# Patient Record
Sex: FEMALE | Race: OTHER | HISPANIC OR LATINO | Employment: OTHER | URBAN - METROPOLITAN AREA
[De-identification: names, ages, dates, MRNs, and addresses within clinical notes are randomized per-mention and may not be internally consistent; named-entity substitution may affect disease eponyms.]

---

## 2019-01-20 ENCOUNTER — HOSPITAL ENCOUNTER (EMERGENCY)
Facility: HOSPITAL | Age: 84
Discharge: HOME | End: 2019-01-20
Attending: EMERGENCY MEDICINE
Payer: MEDICAID

## 2019-01-20 ENCOUNTER — HOSPITAL ENCOUNTER (EMERGENCY)
Dept: RADIOLOGY | Facility: HOSPITAL | Age: 84
Discharge: HOME | End: 2019-01-20
Payer: MEDICAID

## 2019-01-20 ENCOUNTER — APPOINTMENT (EMERGENCY)
Dept: NON INVASIVE DIAGNOSTICS | Facility: CLINIC | Age: 84
End: 2019-01-20
Payer: MEDICAID

## 2019-01-20 ENCOUNTER — APPOINTMENT (EMERGENCY)
Dept: RADIOLOGY | Facility: HOSPITAL | Age: 84
End: 2019-01-20
Payer: MEDICAID

## 2019-01-20 VITALS
WEIGHT: 150 LBS | HEART RATE: 62 BPM | HEIGHT: 60 IN | TEMPERATURE: 97.8 F | RESPIRATION RATE: 18 BRPM | OXYGEN SATURATION: 96 % | BODY MASS INDEX: 29.45 KG/M2 | SYSTOLIC BLOOD PRESSURE: 164 MMHG | DIASTOLIC BLOOD PRESSURE: 67 MMHG

## 2019-01-20 DIAGNOSIS — S82.253A: Primary | ICD-10-CM

## 2019-01-20 DIAGNOSIS — S82.453A: ICD-10-CM

## 2019-01-20 LAB
ANION GAP SERPL CALCULATED.3IONS-SCNC: 6 MMOL/L (ref 4–13)
BASOPHILS # BLD AUTO: 0.03 THOUSANDS/ΜL (ref 0–0.1)
BASOPHILS NFR BLD AUTO: 1 % (ref 0–1)
BUN SERPL-MCNC: 32 MG/DL (ref 5–25)
CALCIUM SERPL-MCNC: 9.2 MG/DL (ref 8.3–10.1)
CHLORIDE SERPL-SCNC: 103 MMOL/L (ref 100–108)
CO2 SERPL-SCNC: 26 MMOL/L (ref 21–32)
CREAT SERPL-MCNC: 1.2 MG/DL (ref 0.6–1.3)
EOSINOPHIL # BLD AUTO: 0.22 THOUSAND/ΜL (ref 0–0.61)
EOSINOPHIL NFR BLD AUTO: 4 % (ref 0–6)
ERYTHROCYTE [DISTWIDTH] IN BLOOD BY AUTOMATED COUNT: 13.6 % (ref 11.6–15.1)
GFR SERPL CREATININE-BSD FRML MDRD: 41 ML/MIN/1.73SQ M
GLUCOSE SERPL-MCNC: 126 MG/DL (ref 65–140)
HCT VFR BLD AUTO: 34.4 % (ref 34.8–46.1)
HGB BLD-MCNC: 10.8 G/DL (ref 11.5–15.4)
IMM GRANULOCYTES # BLD AUTO: 0.02 THOUSAND/UL (ref 0–0.2)
IMM GRANULOCYTES NFR BLD AUTO: 0 % (ref 0–2)
LYMPHOCYTES # BLD AUTO: 1.79 THOUSANDS/ΜL (ref 0.6–4.47)
LYMPHOCYTES NFR BLD AUTO: 31 % (ref 14–44)
MCH RBC QN AUTO: 30.9 PG (ref 26.8–34.3)
MCHC RBC AUTO-ENTMCNC: 31.4 G/DL (ref 31.4–37.4)
MCV RBC AUTO: 99 FL (ref 82–98)
MONOCYTES # BLD AUTO: 0.4 THOUSAND/ΜL (ref 0.17–1.22)
MONOCYTES NFR BLD AUTO: 7 % (ref 4–12)
NEUTROPHILS # BLD AUTO: 3.27 THOUSANDS/ΜL (ref 1.85–7.62)
NEUTS SEG NFR BLD AUTO: 57 % (ref 43–75)
NRBC BLD AUTO-RTO: 0 /100 WBCS
PLATELET # BLD AUTO: 239 THOUSANDS/UL (ref 149–390)
PMV BLD AUTO: 10 FL (ref 8.9–12.7)
POTASSIUM SERPL-SCNC: 3.4 MMOL/L (ref 3.5–5.3)
RBC # BLD AUTO: 3.49 MILLION/UL (ref 3.81–5.12)
SODIUM SERPL-SCNC: 135 MMOL/L (ref 136–145)
WBC # BLD AUTO: 5.73 THOUSAND/UL (ref 4.31–10.16)

## 2019-01-20 PROCEDURE — 99284 EMERGENCY DEPT VISIT MOD MDM: CPT

## 2019-01-20 PROCEDURE — 93971 EXTREMITY STUDY: CPT | Performed by: SURGERY

## 2019-01-20 PROCEDURE — 73610 X-RAY EXAM OF ANKLE: CPT

## 2019-01-20 PROCEDURE — 80048 BASIC METABOLIC PNL TOTAL CA: CPT | Performed by: EMERGENCY MEDICINE

## 2019-01-20 PROCEDURE — 93971 EXTREMITY STUDY: CPT

## 2019-01-20 PROCEDURE — 96374 THER/PROPH/DIAG INJ IV PUSH: CPT

## 2019-01-20 PROCEDURE — 85025 COMPLETE CBC W/AUTO DIFF WBC: CPT | Performed by: EMERGENCY MEDICINE

## 2019-01-20 PROCEDURE — 73590 X-RAY EXAM OF LOWER LEG: CPT

## 2019-01-20 PROCEDURE — 96376 TX/PRO/DX INJ SAME DRUG ADON: CPT

## 2019-01-20 PROCEDURE — 36415 COLL VENOUS BLD VENIPUNCTURE: CPT | Performed by: EMERGENCY MEDICINE

## 2019-01-20 RX ORDER — MORPHINE SULFATE 4 MG/ML
4 INJECTION, SOLUTION INTRAMUSCULAR; INTRAVENOUS ONCE
Status: COMPLETED | OUTPATIENT
Start: 2019-01-20 | End: 2019-01-20

## 2019-01-20 RX ORDER — POTASSIUM CHLORIDE 20 MEQ/1
20 TABLET, EXTENDED RELEASE ORAL ONCE
Status: COMPLETED | OUTPATIENT
Start: 2019-01-20 | End: 2019-01-20

## 2019-01-20 RX ORDER — KETOROLAC TROMETHAMINE 10 MG/1
10 TABLET, FILM COATED ORAL EVERY 6 HOURS PRN
COMMUNITY
End: 2019-02-08

## 2019-01-20 RX ORDER — IBUPROFEN 400 MG/1
400 TABLET ORAL EVERY 6 HOURS PRN
Qty: 30 TABLET | Refills: 0 | Status: SHIPPED | OUTPATIENT
Start: 2019-01-20

## 2019-01-20 RX ORDER — OXYCODONE HYDROCHLORIDE AND ACETAMINOPHEN 5; 325 MG/1; MG/1
1 TABLET ORAL EVERY 4 HOURS PRN
Qty: 14 TABLET | Refills: 0 | Status: SHIPPED | OUTPATIENT
Start: 2019-01-20 | End: 2019-01-30

## 2019-01-20 RX ORDER — VALSARTAN AND HYDROCHLOROTHIAZIDE 160; 25 MG/1; MG/1
1 TABLET ORAL 2 TIMES DAILY
COMMUNITY
End: 2019-03-06

## 2019-01-20 RX ADMIN — MORPHINE SULFATE 2 MG: 4 INJECTION INTRAVENOUS at 13:58

## 2019-01-20 RX ADMIN — POTASSIUM CHLORIDE 20 MEQ: 1500 TABLET, EXTENDED RELEASE ORAL at 12:54

## 2019-01-20 RX ADMIN — MORPHINE SULFATE 4 MG: 4 INJECTION INTRAVENOUS at 12:30

## 2019-01-20 NOTE — DISCHARGE INSTRUCTIONS
If any worsening pain, decreased sensation in the lower leg, fevers come back to the emergency department you have to call the number to make an appointment  Fractura de pierna   CUIDADO AMBULATORIO:   [place] fractura de pierna  es saturnino quebradura en cualquiera de los 3 huesos largos de la pierna  El fémur es el hueso más alyce y va de mayes cadera a la rodilla  Elnor Mends y tibia son los 2 huesos en la parte inferior de mayes pierna que Chari Francheska de mayes rodilla al tobillo  Los signos y síntomas más comunes incluyen los siguientes:   · Dolor que empeora cuando usted se para sobre la pierna lesionada o la mueve    · Dificultad para  la pierna    · Posición o forma anormal de la pierna    · Inflamación o moretones    · Debilidad o pérdida de sensación en la pierna  Busque atención médica de inmediato si:   · Mayes pierna se siente cálida, sensible y adolorida  Se podría adina inflamado y gerber  · El dolor que siente en mayes pierna lesionada empeora aún después de descansar y lory medicamentos  · El yeso se moja o se daña  · El pie o los dedos del pie están entumecidos  · La piel en los dedos de la pierna lesionada se enrojece, está fría, o [place]  Comuníquese con mayes médico o mayes especialista en huesos si:   · Usted tiene fiebre  · El yeso o la abrazadera están muy apretados  · Hay manchas nuevas de arnoldo o un mal olor que sale de por debajo del yeso  · Usted tiene dificultad para  mayes pierna o la dificultad que ya tenía empeora  · Usted tiene preguntas o inquietudes acerca de mayes condición o cuidado  El [address] tratamiento va a depender del tipo de fractura que usted tenga y mayes gravedad  Es posible que usted necesite alguno de los siguientes:  · Miranda Knuckles, un yeso o saturnino férula  se podrían colocar en mayes pierna para limitar mayes movimiento y sostener los huesos fracturados en mayes lugar  Estos aparatos podrían ayudar a disminuir el dolor y prevenir más daño a los [place]      · Un medicamento con receta para el dolor  podrían ser [place]  Pregunte cómo lory estos medicamentos de saturnino forma [place]  · AINEs (Analgésicos antiinflamatorios no esteroides) francis el ibuprofeno, ayudan a disminuir la inflamación, el dolor y la [place]  Los AINEs pueden causar sangrado estomacal o problemas renales en ciertas personas  Si usted angélica un medicamento anticoagulante, siempre pregúntele a mayes médico si los ORALIA son seguros para usted  Siempre carla la etiqueta de vipul medicamento y [place] instrucciones  · Acetaminofeno:  iwona el dolor y baja la fiebre  Está disponible sin receta médica  Pregunte la cantidad y la frecuencia con que debe tomarlos  [address]  Carla las etiquetas de todos los demás medicamentos que esté usando para saber si también contienen acetaminofén, o pregunte a mayes médico o farmacéutico  El acetaminofén puede causar daño en el hígado cuando no se angélica de forma correcta  No use más de 4 gramos (4000 miligramos) en total de acetaminofeno en un día  · La fijación externa  es cirugía que se realiza para colocarle tornillos en los huesos fracturados a través de la piel  Los tornillos se fijarán a un dispositivo fuera de mayes pierna  La fijación externa mantiene los [place], de modo que se puedan sanar  Usted aún podría necesitar cirugía abierta en mayes pierna para reparar áreas lesionadas después de que se haya removido la fijación externa  · La reducción abierta y fijación interna  podría realizarse para regresar los huesos a mayes posición correcta  [place] podría incluir el uso de cables, clavos, placas o tornillos especiales  Estos instrumentos sirven para [employer] partes del hueso alineadas de modo que la pierna sane adecuadamente  · La tracción  puede ser necesaria si mayes hueso se quebró [employer]  La tracción [employer] para colocarlos nuevamente en mayes lugar  Es posible que se coloque un clavo en mayes hueso o yeso y se amarre al dispositivo de tracción   Se cuelgan pesas del dispositivo de tracción para ayudar a poner los [place] posición correcta  Cuidado del yeso o la férula:   · Fíjese todos los días si la piel alrededor del yeso o la férula está enrojecida o [place]  · No use objetos afilados o punzantes para rascarse la piel debajo del yeso o la férula  · No se quite la férula a menos que mayes médico lo autorice  Bañarse con un yeso o saturnino férula:  No permita que el yeso o férula se moje  Antes de bañarse, cubra el yeso o la férula con saturnino bolsa plástica  Pegue la bolsa a mayes piel encima de la férula o yeso para sellar el agua  Mantenga la pierna fuera del agua en harvinder de fugas en la bolsa  Pregunte cuándo puede lory un baño o [place]  Cuidados personales:   · Descanse  la pierna francis se le indique y evite actividades que causen dolor en las piernas  · Aplique hielo  en la pierna de 15 a 20 minutos cada hora o francis se le indique  Use un paquete de hielo o ponga hielo molido dentro de [employer]  Cúbrala con saturnino toalla  El hielo ayuda a evitar daño al tejido y a disminuir la inflamación y el dolor  · Eleve  mayes pierna por encima del nivel de mayes corazón con la mayor frecuencia posible  [place] va a disminuir inflamación y el dolor  Coloque mayes pierna sobre almohadas o cobijas para mantenerla elevada cómodamente  · Use muletas o un andador  según las indicaciones  Las [employer] ayudarán a caminar y quitar un poco de peso de la pierna lesionada hasta que sane  · Fisioterapia  podría recomendarse  Un fisioterapeuta le puede enseñar ejercicios para ayudarle a mejorar el movimiento y la fuerza, y para disminuir el dolor  Acuda a rimma consultas de control con mayes médico o especialista en huesos según le indicaron:  Es posible que deba regresar para que le quiten la férula o el yeso  Es posible que necesite saturnino radiografía de la pierna para comprobar qué tan melvina el hueso booth sanado   Anote rimma preguntas para que se acuerde de hacerlas nai rimma visitas  © 2017 [address] Information is for End User's use only and may not be sold, redistributed or otherwise used for commercial purposes  All illustrations and images included in CareNotes® are the copyrighted property of A D A M , Inc  or Jey Lomeli  Esta información es sólo para uso en educación  [place] intención no es darle un consejo médico sobre enfermedades o tratamientos  Colsulte con anderson Marck Patch farmacéutico antes de seguir cualquier régimen médico para saber si es seguro y efectivo para usted

## 2019-01-20 NOTE — ED PROVIDER NOTES
History  Chief Complaint   Patient presents with    Leg Pain     per son at bedside, pt broke her RLL in December, "splinted", states she flew here yesterday from [place] & now has leg pain  HPI    This is an 80 year female that presents today with right lower extremity pain  Patient lives in [place] and about a month ago end of December she had a fall which resulted and a lower extremity fracture  Patient was found to have a proximal and distal fibular fracture along with a mid tibial fracture  The son states he took the patient to the ER and they put a posterior splint on  He states it was an open fracture and they prescribed antibiotics and discharged  They have been taking care the patient at home  Patient flew from [place] to this states yesterday and she has extreme pain in her leg  The leg is more swollen than normal   She denies any numbness  Pain localized distal to the knee  The open laceration has healed  They have not seen any doctor regarding the fracture  80 year female that presents today with lower extremity pain  Patient's son brought x-rays which I saw  Patient does have swelling of her leg she will need a duplex study  His compartments are soft  Slight warmth to the leg  Will get basic lab work  Patient does have good DP pulses  Patient has tenderness diffusely but also the calf  Prior to Admission Medications   Prescriptions Last Dose Informant Patient Reported? Taking?   ketorolac (TORADOL) 10 mg tablet   Yes Yes   Sig: Take 10 mg by mouth every 6 (six) hours as needed for moderate pain   valsartan-hydrochlorothiazide (DIOVAN-HCT) 160-25 MG per tablet   Yes Yes   Sig: Take 1 tablet by mouth 2 (two) times a day        Facility-Administered Medications: None       Past Medical History:   Diagnosis Date    Fracture     right lower leg    Hypertension        History reviewed  No pertinent surgical history  History reviewed  No pertinent family history    I have reviewed and agree with the history as documented  Social History   Substance Use Topics    Smoking status: Never Smoker    Smokeless tobacco: Never Used    Alcohol use No        Review of Systems   Constitutional: Negative  Negative for diaphoresis and fever  HENT: Negative  Respiratory: Negative  Negative for cough, shortness of breath and wheezing  Cardiovascular: Negative  Negative for chest pain, palpitations and leg swelling  Gastrointestinal: Negative for abdominal distention, abdominal pain, nausea and vomiting  Genitourinary: Negative  Musculoskeletal:        Right lower extremity pain   Skin: Negative  Neurological: Negative  Psychiatric/Behavioral: Negative  All other systems reviewed and are negative  Physical Exam  Physical Exam   Constitutional: She is oriented to person, place, and time  She appears well-developed and well-nourished  No distress  HENT:   Head: Normocephalic and atraumatic  Mouth/Throat: Oropharynx is clear and moist    Eyes: Pupils are equal, round, and reactive to light  EOM are normal    Neck: Normal range of motion  Neck supple  Cardiovascular: Normal rate, regular rhythm and normal heart sounds  No murmur heard  Pulmonary/Chest: Effort normal and breath sounds normal  No respiratory distress  She has no wheezes  Abdominal: Soft  Bowel sounds are normal  She exhibits no distension  There is no tenderness  There is no rebound and no guarding  Musculoskeletal: Normal range of motion  She exhibits edema and tenderness  Right lower extremity slightly warm to the shin  2+ DP pulses  Soft compartments  Tenderness diffusely but most pronounced and the calf area  Patient has some swelling localized to the ankle  Pain with movement of her ankle  Neurological: She is alert and oriented to person, place, and time  Skin: Skin is warm  She is not diaphoretic  Psychiatric: She has a normal mood and affect  Vitals reviewed        Vital Signs  ED Triage Vitals [01/20/19 1159]   Temperature Pulse Respirations Blood Pressure SpO2   97 8 °F (36 6 °C) 64 18 (!) 205/84 99 %      Temp Source Heart Rate Source Patient Position - Orthostatic VS BP Location FiO2 (%)   Tympanic Monitor Sitting Right arm --      Pain Score       3           Vitals:    01/20/19 1415 01/20/19 1430 01/20/19 1445 01/20/19 1515   BP: 155/65  151/65 164/67   Pulse: 60 60 64 62   Patient Position - Orthostatic VS:           Visual Acuity      ED Medications  Medications   morphine (PF) 4 mg/mL injection 4 mg (4 mg Intravenous Given 1/20/19 1230)   potassium chloride (K-DUR,KLOR-CON) CR tablet 20 mEq (20 mEq Oral Given 1/20/19 1254)   morphine (PF) 4 mg/mL injection 4 mg (2 mg Intravenous Given 1/20/19 1358)       Diagnostic Studies  Results Reviewed     Procedure Component Value Units Date/Time    Basic metabolic panel [[id number]]  (Abnormal) Collected:  01/20/19 1225    Lab Status:  Final result Specimen:  Blood from Arm, Left Updated:  01/20/19 1243     Sodium 135 (L) mmol/L      Potassium 3 4 (L) mmol/L      Chloride 103 mmol/L      CO2 26 mmol/L      ANION GAP 6 mmol/L      BUN 32 (H) mg/dL      Creatinine 1 20 mg/dL      Glucose 126 mg/dL      Calcium 9 2 mg/dL      eGFR 41 ml/min/1 73sq m     Narrative:         National Kidney Disease Education Program recommendations are as follows:  GFR calculation is accurate only with a steady state creatinine  Chronic Kidney disease less than 60 ml/min/1 73 sq  meters  Kidney failure less than 15 ml/min/1 73 sq  meters      CBC and differential [[id number]]  (Abnormal) Collected:  01/20/19 1225    Lab Status:  Final result Specimen:  Blood from Arm, Left Updated:  01/20/19 1233     WBC 5 73 Thousand/uL      RBC 3 49 (L) Million/uL      Hemoglobin 10 8 (L) g/dL      Hematocrit 34 4 (L) %      MCV 99 (H) fL      MCH 30 9 pg      MCHC 31 4 g/dL      RDW 13 6 %      MPV 10 0 fL      Platelets 083 Thousands/uL      nRBC 0 /100 WBCs Neutrophils Relative 57 %      Immat GRANS % 0 %      Lymphocytes Relative 31 %      Monocytes Relative 7 %      Eosinophils Relative 4 %      Basophils Relative 1 %      Neutrophils Absolute 3 27 Thousands/µL      Immature Grans Absolute 0 02 Thousand/uL      Lymphocytes Absolute 1 79 Thousands/µL      Monocytes Absolute 0 40 Thousand/µL      Eosinophils Absolute 0 22 Thousand/µL      Basophils Absolute 0 03 Thousands/µL                  VAS lower limb venous duplex study, unilateral/limited   Final Result by Cassie Claudio MD (01/20 1677)      XR ankle 3+ views RIGHT   Final Result by Alexus Hobson MD (01/20 4009)      Comminuted fracture of the mid to distal tibial shaft  Comminuted fracture of the distal fibular shaft  Clinical service was aware of the findings at the time of dictation  Workstation performed: [id number]         XR tibia fibula 2 views RIGHT   Final Result by Alexus Hobson MD (01/20 4794)      Comminuted fracture of the mid to distal tibial shaft as noted above  Comminuted fractures of the proximal and distal fibular shaft  Clinical service was aware of the findings at the time of dictation           Workstation performed: [id number]                    Procedures  Static Splint Application  Date/Time: 1/20/2019 2:46 PM  Performed by: Mahendra Keene by: Dallin Salguero     Patient location:  Bedside  Procedure performed by emergency physician: Yes    Other Assisting Provider: Yes (comment)    Consent:     Consent obtained:  Verbal    Consent given by:  Patient    Risks discussed:  Numbness, discoloration, pain and swelling    Alternatives discussed:  No treatment  Universal protocol:     Patient identity confirmed:  Verbally with patient  Indication:     Indications: fracture    Pre-procedure details:     Sensation:  Normal  Procedure details:     Laterality:  Right    Location:  Leg    Leg:  R lower leg    Splint type:  Long leg    Supplies:  Ortho-Glass  Post-procedure details:     Pain:  Unchanged    Sensation:  Normal    Neurovascular Exam: skin pink, capillary refill <2 sec, normal pulses and skin intact, warm, and dry      Patient tolerance of procedure: Tolerated well, no immediate complications           Phone Contacts  ED Phone Contact    ED Course  ED Course as of Jan 22 0036   [place] Jan 20, 2019   1403 Spoke with Dr Deb Lundy from Orthopedics who stated this will eventually require surgery but should be done by the [facility] physicians  He does not believe abelardo would be adequate place  Will transfer to [facility] trauma  Patient needs admission for pain control and difficulty with ambulation  Pending Vascular duplex study    1406 Negative duplex study      1418 I spoke with Dr Eloisa Wallace from Orthopedics at [facility] who stated that patient does not need to be admitted to the hospital this can all be done outpatient  Patient is to be given a wheelchair and sent home on pain medication  He states there is no indication for admission at all with this patient  I discussed with the surgeon both at [facility] and at [facility] regarding pain management this is been a 1 month issue she has been having she does not know how to follow up but they still believe that she should be given the number to follow up with Orthopedics  I have attempted multiple times to get this patient admitted I spoke with or trauma they stated this is a an isolated Ortho injury and should only go to Ortho since Ortho was not accepting this patient I will discharge this patient and discussed with family regarding the plan    [id number] Patient splinted without any issues  I discussed with family regarding outpatient followup who are hesitant  Discussed best to call the office this week to followup and can get outpatient workup  Wheelchair arranged  MDM  Number of Diagnoses or Management Options  Diagnosis management comments:  Will do repeat x-rays to assess the progress  CritCare Time    Disposition  Final diagnoses:   Comminuted fracture of shaft of tibia   Comminuted fracture of shaft of fibula     Time reflects when diagnosis was documented in both MDM as applicable and the Disposition within this note     Time User Action Codes Description Comment    1/20/2019  2:49 PM Joanie Plasencia Sathyaanjelica Darrylnitin U  8  [[id number]] Comminuted fracture of shaft of tibia     1/20/2019  2:49 PM [address] Comminuted fracture of shaft of fibula     1/20/2019  2:49 PM Josse Moss Comminuted fracture of shaft of fibula     1/20/2019  2:49 PM George Soria Remove [[id number]] Comminuted fracture of shaft of tibia     1/20/2019  2:49 PM George Soria Remove [[id number]] Comminuted fracture of shaft of fibula     1/20/2019  2:49 PM George Soria Add [[id number]] Comminuted fracture of shaft of tibia     1/20/2019  2:49 PM George Soria Add [[id number]] Comminuted fracture of shaft of fibula       ED Disposition     ED Disposition Condition Comment    Discharge  Arnav Cruise discharge to home/self care      Condition at discharge: Good        Follow-up Information     Follow up With Specialties Details Why Contact Info    Alecia Perera MD Orthopedic Surgery Schedule an appointment as soon as possible for a visit Please call this number to make an appointment [address]  [phone]            Discharge Medication List as of 1/20/2019  2:51 PM      START taking these medications    Details   ibuprofen (MOTRIN) 400 mg tablet Take 1 tablet (400 mg total) by mouth every 6 (six) hours as needed for mild pain, Starting Sun 1/20/2019, Print      oxyCODONE-acetaminophen (PERCOCET) 5-325 mg per tablet Take 1 tablet by mouth every 4 (four) hours as needed for moderate pain for up to 10 days Max Daily Amount: 6 tablets, Starting Sun 1/20/2019, Until Wed 1/30/2019, Print         CONTINUE these medications which have NOT CHANGED    Details   ketorolac (TORADOL) 10 mg tablet Take 10 mg by mouth every 6 (six) hours as needed for moderate pain, Historical Med      valsartan-hydrochlorothiazide (DIOVAN-HCT) 160-25 MG per tablet Take 1 tablet by mouth 2 (two) times a day  , Historical Med           No discharge procedures on file      ED Provider  Electronically Signed by           Princess Lorne MD  01/22/19 9657

## 2019-01-21 ENCOUNTER — TELEPHONE (OUTPATIENT)
Dept: OBGYN CLINIC | Facility: CLINIC | Age: 84
End: 2019-01-21

## 2019-01-21 NOTE — TELEPHONE ENCOUNTER
Dr Faith Lora' son Darreld Can called because she has a comminuted fx of shaft of tibia  She went to [facility] ED and they told her to f/u with Dr Michelle Blood however she has [address]   Best contact # [phone] Thank you

## 2019-01-22 NOTE — TELEPHONE ENCOUNTER
Dr Darci Sheth reviewed imaging and said she will need to go to a trauma center in [place], if her insurance will not allow her to go to [place]

## 2019-01-23 ENCOUNTER — TELEPHONE (OUTPATIENT)
Dept: OBGYN CLINIC | Facility: CLINIC | Age: 84
End: 2019-01-23

## 2019-01-23 NOTE — TELEPHONE ENCOUNTER
I spoke with Juhi Mckinney and informed him that the patient needs to call her insurance and find an Orthopedic Trauma physician as per Dr Sabi Vasquez  I had Florceita explain this to Juhi Mckinney in [address] [facility] also  Patient stated he understood and would call the insurance    Todays appointment has been cancelled

## 2019-02-08 ENCOUNTER — HOSPITAL ENCOUNTER (EMERGENCY)
Facility: HOSPITAL | Age: 84
Discharge: HOME | End: 2019-02-08
Attending: EMERGENCY MEDICINE
Payer: COMMERCIAL

## 2019-02-08 VITALS
HEART RATE: 64 BPM | RESPIRATION RATE: 18 BRPM | TEMPERATURE: 98.2 F | OXYGEN SATURATION: 98 % | DIASTOLIC BLOOD PRESSURE: 77 MMHG | SYSTOLIC BLOOD PRESSURE: 182 MMHG

## 2019-02-08 DIAGNOSIS — S82.91XA CLOSED FRACTURE OF RIGHT LOWER EXTREMITY, INITIAL ENCOUNTER: Primary | ICD-10-CM

## 2019-02-08 PROCEDURE — 99283 EMERGENCY DEPT VISIT LOW MDM: CPT

## 2019-02-08 RX ORDER — OXYCODONE HYDROCHLORIDE AND ACETAMINOPHEN 5; 325 MG/1; MG/1
1 TABLET ORAL EVERY 4 HOURS PRN
COMMUNITY
End: 2019-03-06

## 2019-02-08 RX ORDER — OXYCODONE HYDROCHLORIDE AND ACETAMINOPHEN 5; 325 MG/1; MG/1
1 TABLET ORAL EVERY 6 HOURS PRN
Qty: 15 TABLET | Refills: 0 | Status: SHIPPED | OUTPATIENT
Start: 2019-02-08 | End: 2019-03-06

## 2019-02-08 RX ORDER — CHLORHEXIDINE GLUCONATE 0.12 MG/ML
15 RINSE ORAL 2 TIMES DAILY
Qty: 120 ML | Refills: 0 | Status: SHIPPED | OUTPATIENT
Start: 2019-02-08

## 2019-02-09 NOTE — ED PROVIDER NOTES
History  Chief Complaint   Patient presents with    Leg Injury     Patient seen in ED 1/20 with comminuted fx right tibia, has splint in place  Son has been having issues with insurance and arranging follow up with ortho so patient not seen by ortho  Patient has been taking splint off every night, she tells son it is too hot  Patient presents for evaluation of right leg fracture  Patient had a fall in [place] over 1 month ago  She needs surgery but is having trouble finding a doctor  [facility] orthopedics referred her to [facility] because of the extent of the fracture, however, she has [place] Medicaid and cant be seen in [place]  She was told to call her insurance company to find a doctor  The son has been unable to locate one  Patient is also taking the splint off at night because it is too hot  Also complains of dental swelling and that her dentures dont fit  History provided by:  Patient   used: No        Prior to Admission Medications   Prescriptions Last Dose Informant Patient Reported? Taking?   ibuprofen (MOTRIN) 400 mg tablet Past Week at Unknown time  No Yes   Sig: Take 1 tablet (400 mg total) by mouth every 6 (six) hours as needed for mild pain   oxyCODONE-acetaminophen (PERCOCET) 5-325 mg per tablet 2/7/2019 at Unknown time Family Member Yes Yes   Sig: Take 1 tablet by mouth every 4 (four) hours as needed for moderate pain   valsartan-hydrochlorothiazide (DIOVAN-HCT) 160-25 MG per tablet 2/8/2019 at Unknown time  Yes Yes   Sig: Take 1 tablet by mouth 2 (two) times a day        Facility-Administered Medications: None       Past Medical History:   Diagnosis Date    Fracture     right lower leg    Hypertension        History reviewed  No pertinent surgical history  History reviewed  No pertinent family history  I have reviewed and agree with the history as documented      Social History   Substance Use Topics    Smoking status: Never Smoker    Smokeless tobacco: Never Used   [facility] Alcohol use No        Review of Systems   All other systems reviewed and are negative  Physical Exam  Physical Exam   Constitutional: No distress  HENT:   Mouth/Throat: Oropharynx is clear and moist        Cardiovascular: Normal rate, regular rhythm and intact distal pulses  Pulmonary/Chest: Effort normal and breath sounds normal  No respiratory distress  Musculoskeletal:        Legs:  Neurological: She is alert  Skin: Capillary refill takes less than 2 seconds  She is not diaphoretic  Nursing note and vitals reviewed        Vital Signs  ED Triage Vitals [02/08/19 2120]   Temperature Pulse Respirations Blood Pressure SpO2   98 2 °F (36 8 °C) 64 18 (!) 182/77 98 %      Temp Source Heart Rate Source Patient Position - Orthostatic VS BP Location FiO2 (%)   Tympanic Monitor Sitting Left arm --      Pain Score       3           Vitals:    02/08/19 2120 02/08/19 2130   BP: (!) 182/77 (!) 182/77   Pulse: 64    Patient Position - Orthostatic VS: Sitting        Visual Acuity      ED Medications  Medications - No data to display    Diagnostic Studies  Results Reviewed     None                 No orders to display              Procedures  Orthopedic Injury  Date/Time: 2/8/2019 11:36 PM  Performed by: Aguilar Slice by: Diony Young     Patient Location:  ED  Consent given by:  Patient  Patient identity confirmed:  Verbally with patient and arm band  Injury location:  Lower leg  Location details:  Right lower leg  Injury type:  Fracture  Fracture type: tibial and fibular shafts    Neurovascular status: Neurovascularly intact    Distal perfusion: normal    Neurological function: normal    Range of motion: reduced    Manipulation performed?: No    Immobilization:  Splint  Splint type:  Sugar tong (and posterior)  Supplies used:  Ortho-Glass  Neurovascular status: Neurovascularly intact    Distal perfusion: normal    Neurological function: normal    Range of motion: unchanged    Patient tolerance: Patient tolerated the procedure well with no immediate complications           Phone Contacts  ED Phone Contact    ED Course                               MDM  Number of Diagnoses or Management Options  Closed fracture of right lower extremity, initial encounter:   Diagnosis management comments: Pulse ox 98% on RA indicating adequate oxygenation    New splint applied as last one was dirty  Advised the son he needs to work with the insurance company to find a doctor and gave the number of some orthopedic clinics in [place] to call  Gave mouth wash for mouth and if continues needs to see dentist         Amount and/or Complexity of Data Reviewed  Decide to obtain previous medical records or to obtain history from someone other than the patient: yes  Review and summarize past medical records: yes    Patient Progress  Patient progress: stable      Disposition  Final diagnoses:   Closed fracture of right lower extremity, initial encounter     Time reflects when diagnosis was documented in both MDM as applicable and the Disposition within this note     Time User Action Codes Description Comment    2/8/2019  9:49 PM Basia Lazaro 49 Closed fracture of right lower extremity, initial encounter       ED Disposition     ED Disposition Condition Date/Time Comment    Discharge  Fri Feb 8, 2019  9:49 PM Devaughn discharge to home/self care      Condition at discharge: stable        Follow-up Information     Follow up With Specialties Details Why Contact Info    [address] [place] [address]   [phone] [address] [place] [address] Orthopedic clinic    [address] [place] [address]  [phone]          Discharge Medication List as of 2/8/2019  9:54 PM      START taking these medications    Details   !! oxyCODONE-acetaminophen (PERCOCET) 5-325 mg per tablet Take 1 tablet by mouth every 6 (six) hours as needed for moderate pain for up to 15 doses Max Daily Amount: 4 tablets, Starting Fri 2/8/2019, Print       !! - Potential duplicate medications found  Please discuss with provider  CONTINUE these medications which have NOT CHANGED    Details   ibuprofen (MOTRIN) 400 mg tablet Take 1 tablet (400 mg total) by mouth every 6 (six) hours as needed for mild pain, Starting Sun 1/20/2019, Print      !! oxyCODONE-acetaminophen (PERCOCET) 5-325 mg per tablet Take 1 tablet by mouth every 4 (four) hours as needed for moderate pain, Historical Med      valsartan-hydrochlorothiazide (DIOVAN-HCT) 160-25 MG per tablet Take 1 tablet by mouth 2 (two) times a day  , Historical Med       !! - Potential duplicate medications found  Please discuss with provider  No discharge procedures on file      ED Provider  Electronically Signed by           Brody Pond DO  02/08/19 1413

## 2019-02-09 NOTE — DISCHARGE INSTRUCTIONS
Fractura de pierna   LO QUE NECESITA SABER:   Saturnino fractura de pierna es saturnino fractura en cualquiera de los 3 huesos largos de la pierna  El fémur es el hueso más alyce y va de mayes cadera a la rodilla  [place] y tibia son los 2 huesos en la parte inferior de mayes pierna que Alexia Heymann de mayes rodilla al tobillo  INSTRUCCIONES SOBRE EL [facility]:   Busque atención médica de inmediato si:   · Mayes pierna se siente cálida, sensible y adolorida  Se podría adina inflamado y gerber  · El dolor que siente en mayes pierna lesionada empeora aún después de descansar y lory medicamentos  · El yeso se moja o se daña  · El pie o los dedos del pie están entumecidos  · La piel en los dedos de la pierna lesionada se enrojece, está fría, o [place]  Comuníquese con mayes médico o mayes especialista en huesos si:   · Usted tiene fiebre  · El yeso o la abrazadera están muy apretados  · Hay manchas nuevas de arnoldo o un mal olor que sale de por debajo del yeso  · Usted tiene dificultad para  mayes pierna o la dificultad que ya tenía empeora  · Usted tiene preguntas o inquietudes acerca de mayes condición o cuidado  Medicamentos:   · Un medicamento con receta para el dolor  podrían ser [place]  Pregunte cómo lory estos medicamentos de saturnino forma [place]  · AINEs (Analgésicos antiinflamatorios no esteroides) francis el ibuprofeno, ayudan a disminuir la inflamación, el dolor y la [place]  Los AINEs pueden causar sangrado estomacal o problemas renales en ciertas personas  Si usted angélica un medicamento anticoagulante, siempre pregúntele a mayes médico si los ORALIA son seguros para usted  Siempre carla la etiqueta de vipul medicamento y [place] instrucciones  · Acetaminofeno:  iwona el dolor y baja la fiebre  Está disponible sin receta médica  Pregunte la cantidad y la frecuencia con que debe tomarlos  [facility]   Carla las etiquetas de todos los demás medicamentos que esté usando para saber si también contienen acetaminofén, o pregunte a mayes médico o farmacéutico  El acetaminofén puede causar daño en el hígado cuando no se angélica de forma correcta  No use más de 4 gramos (4000 miligramos) en total de acetaminofeno en un día  · [place] rimma medicamentos francis se le haya indicado  Consulte con mayes médico si usted ariana que mayes medicamento no le está ayudando o si presenta efectos secundarios  Infórmele si es alérgico a algún medicamento  Mantenga saturnino lista actualizada de los [place], las vitaminas y los productos herbales que angélica  Incluya los siguientes datos de los medicamentos: cantidad, frecuencia y motivo de administración  Traiga con usted la lista o los envases de la píldoras a rimma citas de seguimiento  Lleve la lista de los medicamentos con usted en harvinder de saturnino emergencia  Cuidado del yeso o la férula:   · Fíjese todos los días si la piel alrededor del yeso o la férula está enrojecida o [place]  · No use objetos afilados o punzantes para rascarse la piel debajo del yeso o la férula  · No se quite la férula a menos que mayes médico lo autorice  Bañarse con un yeso o saturnino férula:  No permita que el yeso o férula se moje  Antes de bañarse, cubra el yeso o la férula con saturnino bolsa plástica  Pegue la bolsa a mayes piel encima de la férula o yeso para sellar el agua  Mantenga la pierna fuera del agua en harvinder de fugas en la bolsa  Pregunte cuándo puede lory un baño o [place]  Cuidados personales:   · Descanse  la pierna francis se le indique y evite actividades que causen dolor en las piernas  · Aplique hielo  en la pierna de 15 a 20 minutos cada hora o francis se le indique  Use un paquete de hielo o ponga hielo molido dentro de [employer]  Cúbrala con saturnino toalla  El hielo ayuda a evitar daño al tejido y a disminuir la inflamación y el dolor  · Eleve  mayes pierna por encima del nivel de mayes corazón con la mayor frecuencia posible  [place] va a disminuir inflamación y el dolor   Coloque mayes pierna sobre almohadas o cobijas para mantenerla elevada cómodamente  · Use muletas o un andador  según las indicaciones  Las [employer] ayudarán a caminar y quitar un poco de peso de la pierna lesionada hasta que sane  · Fisioterapia  podría recomendarse  Un fisioterapeuta le puede enseñar ejercicios para ayudarle a mejorar el movimiento y la fuerza, y para disminuir el dolor  Acuda a rimma consultas de control con anderson médico o especialista en huesos según le indicaron:  Es posible que deba regresar para que le quiten la férula o el yeso  Es posible que necesite saturnino radiografía de la pierna para comprobar qué tan melvina el hueso booth sanado  Anote rimma preguntas para que se acuerde de hacerlas ani rimma visitas  © 2017 [address] Information is for End User's use only and may not be sold, redistributed or otherwise used for commercial purposes  All illustrations and images included in CareNotes® are the copyrighted property of [employer] , Inc  or Jey Lomeli  Esta información es sólo para uso en educación  [place] intención no es darle un consejo médico sobre enfermedades o tratamientos  Colsulte con anderson Lito Pinta farmacéutico antes de seguir cualquier régimen médico para saber si es seguro y efectivo para usted

## 2019-03-06 ENCOUNTER — APPOINTMENT (EMERGENCY)
Dept: RADIOLOGY | Facility: HOSPITAL | Age: 84
End: 2019-03-06
Payer: COMMERCIAL

## 2019-03-06 ENCOUNTER — HOSPITAL ENCOUNTER (EMERGENCY)
Facility: HOSPITAL | Age: 84
Discharge: HOME | End: 2019-03-06
Attending: EMERGENCY MEDICINE | Admitting: EMERGENCY MEDICINE
Payer: COMMERCIAL

## 2019-03-06 VITALS
RESPIRATION RATE: 20 BRPM | DIASTOLIC BLOOD PRESSURE: 60 MMHG | HEART RATE: 72 BPM | TEMPERATURE: 99.1 F | OXYGEN SATURATION: 96 % | SYSTOLIC BLOOD PRESSURE: 134 MMHG

## 2019-03-06 DIAGNOSIS — R51.9 FACIAL PAIN: Primary | ICD-10-CM

## 2019-03-06 PROCEDURE — 70486 CT MAXILLOFACIAL W/O DYE: CPT

## 2019-03-06 PROCEDURE — 99283 EMERGENCY DEPT VISIT LOW MDM: CPT

## 2019-03-06 PROCEDURE — 72125 CT NECK SPINE W/O DYE: CPT

## 2019-03-06 RX ORDER — PREDNISONE 20 MG/1
40 TABLET ORAL DAILY
Qty: 8 TABLET | Refills: 0 | Status: SHIPPED | OUTPATIENT
Start: 2019-03-06 | End: 2019-03-10

## 2019-03-06 RX ORDER — PREDNISONE 20 MG/1
60 TABLET ORAL ONCE
Status: COMPLETED | OUTPATIENT
Start: 2019-03-06 | End: 2019-03-06

## 2019-03-06 RX ORDER — LOSARTAN POTASSIUM AND HYDROCHLOROTHIAZIDE 25; 100 MG/1; MG/1
1 TABLET ORAL DAILY
COMMUNITY

## 2019-03-06 RX ORDER — OXYCODONE HYDROCHLORIDE AND ACETAMINOPHEN 5; 325 MG/1; MG/1
1 TABLET ORAL ONCE
Status: COMPLETED | OUTPATIENT
Start: 2019-03-06 | End: 2019-03-06

## 2019-03-06 RX ORDER — OXYCODONE HYDROCHLORIDE AND ACETAMINOPHEN 5; 325 MG/1; MG/1
1 TABLET ORAL EVERY 6 HOURS PRN
COMMUNITY

## 2019-03-06 RX ADMIN — OXYCODONE AND ACETAMINOPHEN 1 TABLET: 5; 325 TABLET ORAL at 17:29

## 2019-03-06 RX ADMIN — PREDNISONE 60 MG: 20 TABLET ORAL at 18:15

## 2019-03-06 NOTE — ED PROVIDER NOTES
History  Chief Complaint   Patient presents with    Oral Pain     facial pain for past 6 months or so saw dentist, gave note saying not related to dental and to consult neuology     49-year-old female presenting today with right-sided facial pain over the past 8 months occurs on a daily basis and is very sensitive  Patient was just seen today by the dentist for the 1st time as she points to pain on the inside of her mouth to the gumline  She was told by the dentist that this is not dental pain origin, patient does not have any teeth  She is also told that there is no signs of infection and that she needs to follow up with a neurologist   She comes with a paper from the dentist stating these things  Patient has Percocet at home for which she is taking with minimal relief  She is not experiencing any double vision or changes in vision  Denies headache  She admits to right-sided neck pain worsens with head turning  She is not taking any other medications at this point time  States that pain worsens with chewing  Denies fevers, nausea, vomiting, scalp tenderness or headache, numbness, paresthesias, double vision, nausea, vomiting, chest pain, diaphoresis   ID number: [id number]          Prior to Admission Medications   Prescriptions Last Dose Informant Patient Reported?  Taking?   chlorhexidine (PERIDEX) 0 12 % solution   No No   Sig: Apply 15 mL to the mouth or throat 2 (two) times a day   ibuprofen (MOTRIN) 400 mg tablet   No No   Sig: Take 1 tablet (400 mg total) by mouth every 6 (six) hours as needed for mild pain   losartan-hydrochlorothiazide (HYZAAR) 100-25 MG per tablet   Yes Yes   Sig: Take 1 tablet by mouth daily   oxyCODONE-acetaminophen (PERCOCET) 5-325 mg per tablet   Yes Yes   Sig: Take 1 tablet by mouth every 6 (six) hours as needed for moderate pain      Facility-Administered Medications: None       Past Medical History:   Diagnosis Date    Fracture     right lower leg    Hypertension        History reviewed  No pertinent surgical history  History reviewed  No pertinent family history  I have reviewed and agree with the history as documented  Social History     Tobacco Use    Smoking status: Never Smoker    Smokeless tobacco: Never Used   Substance Use Topics    Alcohol use: No    Drug use: No        Review of Systems   Constitutional: Negative  HENT: Negative  Eyes: Negative  Respiratory: Negative  Cardiovascular: Negative  Gastrointestinal: Negative  Genitourinary: Negative  Musculoskeletal: Negative  Skin: Negative  Neurological: Negative  All other systems reviewed and are negative  Physical Exam  Physical Exam   Constitutional: She is oriented to person, place, and time  She appears well-developed and well-nourished  HENT:   Head: Normocephalic and atraumatic  Right Ear: External ear normal    Left Ear: External ear normal    Nose: Nose normal    Mouth/Throat: Uvula is midline and oropharynx is clear and moist        Eyes: Pupils are equal, round, and reactive to light  Conjunctivae and EOM are normal    Neck: Normal range of motion  Neck supple  Cardiovascular: Normal rate, regular rhythm, normal heart sounds and intact distal pulses  Exam reveals no gallop and no friction rub  No murmur heard  Pulmonary/Chest: Effort normal and breath sounds normal  No stridor  No respiratory distress  She has no wheezes  She has no rales  She exhibits no tenderness  S PO2 is 96% indicating adequate oxygenation  Abdominal: Soft  Bowel sounds are normal    Neurological: She is alert and oriented to person, place, and time  Skin: Skin is warm and dry  Capillary refill takes less than 2 seconds  Nursing note and vitals reviewed        Vital Signs  ED Triage Vitals [03/06/19 1627]   Temperature Pulse Respirations Blood Pressure SpO2   99 1 °F (37 3 °C) 72 20 134/60 96 %      Temp Source Heart Rate Source Patient Position - Orthostatic VS BP Location FiO2 (%)   Tympanic Monitor Sitting Right arm --      Pain Score       Worst Possible Pain           Vitals:    03/06/19 1627   BP: 134/60   Pulse: 72   Patient Position - Orthostatic VS: Sitting       Visual Acuity      ED Medications  Medications   predniSONE tablet 60 mg (has no administration in time range)   oxyCODONE-acetaminophen (PERCOCET) 5-325 mg per tablet 1 tablet (1 tablet Oral Given 3/6/19 1729)       Diagnostic Studies  Results Reviewed     None                 CT cervical spine without contrast   Final Result by Otis Gaming DO (03/06 1736)   No cervical spine fracture or traumatic malalignment  Workstation performed: [id number]         CT facial bones without contrast   Final Result by Saintclair Dick, MD (03/06 1800)      Edentulous maxilla and mandible  No osseous deformity  No maxillary sinusitis  Workstation performed: [id number]                    Procedures  Procedures       Phone Contacts  ED Phone Contact    ED Course                               MDM  Number of Diagnoses or Management Options  Facial pain:   Diagnosis management comments: No scalp tenderness  Patient states that pain is deep  No reproducible long the scalp or the facial bones, no signs of infection  Suspect potential trigeminal neuralgia  She was told by her dentist follow-up with Neurology, will give her Dr Carter Rang information and have patient follow up with her as well as a PCP  Strict return precautions for any worsening  Patient and family verbalized understanding and agree with the above assessment plan         Amount and/or Complexity of Data Reviewed  Tests in the radiology section of CPT®: reviewed and ordered  Review and summarize past medical records: yes  Independent visualization of images, tracings, or specimens: yes        Disposition  Final diagnoses:   Facial pain     Time reflects when diagnosis was documented in both MDM as applicable and the Disposition within this note     Time User Action Codes Description Comment    3/6/2019  6:07 PM Juan Luis Becerril Add [R51] Facial pain       ED Disposition     ED Disposition Condition Date/Time Comment    Discharge Good Wed Mar 6, 2019  6:06 PM Devaughn discharge to home/self care  Follow-up Information     Follow up With Specialties Details Why Contact Info Additional [address] Emergency Department Emergency Medicine Go to  If symptoms worsen [address] [facility]  [phone] [facility] Vito Allen MD Neurology Schedule an appointment as soon as possible for a visit in 1 day  [address] [address]  [facility]  Schedule an appointment as soon as possible for a visit   Caprice Kovacs [phone]           Patient's Medications   Discharge Prescriptions    PREDNISONE 20 MG TABLET    Take 2 tablets (40 mg total) by mouth daily for 4 days       Start Date: 3/6/2019  End Date: 3/10/2019       Order Dose: 40 mg       Quantity: 8 tablet    Refills: 0     No discharge procedures on file      ED Provider  Electronically Signed by           Isis Stovall PA-C  03/06/19 7797

## 2019-06-17 ENCOUNTER — DOCUMENTATION (OUTPATIENT)
Dept: NEUROLOGY | Facility: CLINIC | Age: 84
End: 2019-06-17